# Patient Record
Sex: MALE | NOT HISPANIC OR LATINO | URBAN - METROPOLITAN AREA
[De-identification: names, ages, dates, MRNs, and addresses within clinical notes are randomized per-mention and may not be internally consistent; named-entity substitution may affect disease eponyms.]

---

## 2017-12-27 ENCOUNTER — MEDICAL CORRESPONDENCE (OUTPATIENT)
Dept: HEALTH INFORMATION MANAGEMENT | Facility: CLINIC | Age: 82
End: 2017-12-27

## 2018-01-23 DIAGNOSIS — H53.10 SUBJECTIVE VISION DISTURBANCE: Primary | ICD-10-CM

## 2018-01-30 ENCOUNTER — OFFICE VISIT (OUTPATIENT)
Dept: OPHTHALMOLOGY | Facility: CLINIC | Age: 83
End: 2018-01-30
Attending: OPHTHALMOLOGY
Payer: MEDICARE

## 2018-01-30 DIAGNOSIS — H49.22 SIXTH NERVE PALSY, LEFT: Primary | ICD-10-CM

## 2018-01-30 DIAGNOSIS — H35.30 AMD (AGE RELATED MACULAR DEGENERATION): ICD-10-CM

## 2018-01-30 DIAGNOSIS — H26.9 NUCLEAR CATARACT, NONSENILE: ICD-10-CM

## 2018-01-30 PROBLEM — Q61.3 POLYCYSTIC KIDNEY DISEASE: Status: ACTIVE | Noted: 2018-01-30

## 2018-01-30 PROBLEM — I48.91 ATRIAL FIBRILLATION (H): Status: ACTIVE | Noted: 2017-07-30

## 2018-01-30 PROBLEM — H53.2 DOUBLE VISION WITH BOTH EYES OPEN: Status: ACTIVE | Noted: 2017-12-24

## 2018-01-30 PROBLEM — I10 HIGH BLOOD PRESSURE: Status: ACTIVE | Noted: 2018-01-30

## 2018-01-30 PROCEDURE — G0463 HOSPITAL OUTPT CLINIC VISIT: HCPCS | Mod: 25,ZF

## 2018-01-30 PROCEDURE — 92060 SENSORIMOTOR EXAMINATION: CPT | Mod: ZF | Performed by: OPHTHALMOLOGY

## 2018-01-30 RX ORDER — CLOPIDOGREL BISULFATE 75 MG/1
TABLET ORAL
COMMUNITY
Start: 2017-07-30

## 2018-01-30 RX ORDER — NITROGLYCERIN 0.4 MG/1
TABLET SUBLINGUAL
COMMUNITY
Start: 2017-07-30

## 2018-01-30 RX ORDER — METOPROLOL TARTRATE 50 MG
TABLET ORAL
COMMUNITY
Start: 2017-07-30

## 2018-01-30 RX ORDER — BUMETANIDE 1 MG/1
TABLET ORAL
COMMUNITY
Start: 2017-10-01

## 2018-01-30 RX ORDER — LOSARTAN POTASSIUM 50 MG/1
TABLET ORAL
COMMUNITY

## 2018-01-30 RX ORDER — CHOLECALCIFEROL (VITAMIN D3) 50 MCG
TABLET ORAL
COMMUNITY

## 2018-01-30 RX ORDER — LANOLIN ALCOHOL/MO/W.PET/CERES
CREAM (GRAM) TOPICAL
COMMUNITY

## 2018-01-30 ASSESSMENT — CUP TO DISC RATIO
OS_RATIO: 0.3
OD_RATIO: 0.3

## 2018-01-30 ASSESSMENT — VISUAL ACUITY
METHOD: SNELLEN - LINEAR
OD_SC: 20/50
OS_SC: 20/30
OD_PH_SC: 20/40

## 2018-01-30 ASSESSMENT — CONF VISUAL FIELD
OS_NORMAL: 1
OD_NORMAL: 1

## 2018-01-30 ASSESSMENT — SLIT LAMP EXAM - LIDS
COMMENTS: NORMAL
COMMENTS: NORMAL

## 2018-01-30 ASSESSMENT — TONOMETRY
OS_IOP_MMHG: 13
OD_IOP_MMHG: 14
IOP_METHOD: ICARE

## 2018-01-30 ASSESSMENT — EXTERNAL EXAM - RIGHT EYE: OD_EXAM: NORMAL

## 2018-01-30 ASSESSMENT — EXTERNAL EXAM - LEFT EYE: OS_EXAM: NORMAL

## 2018-01-30 NOTE — NURSING NOTE
Chief Complaints and History of Present Illnesses   Patient presents with     Neurologic Problem     new patient     HPI    Symptoms:              Comments:  Vitaliy is an 84 year old male referred for:    1. Diplopia  Intermittent. Used to be constant. Feels it is clearing up.   Worse in the left eye  Onset dec 24/2017. No precipitating event.   No history of diabetes or hyperlipidemia. His hypertension is controlled with medications.   Worse when tired. Unsure if worse at near or distance.     Ernst MARSHALL 1:39 PM January 30, 2018

## 2018-01-30 NOTE — LETTER
2018         RE:  :  MRN: Vitaliy Espinoza  1/15/1934  [id number]     Dear Dr. Devries:     Thank you for asking me to see your very pleasant patient, Vitaliy Espinoza, in neuro-ophthalmic consultation.  I would like to thank you for sending your records and I have summarized them in the history of present illness. He presented with his spouse and daughter who provided additional history.  My assessment and plan are below.  For further details, please see my attached clinic note.          Assessment & Plan     Vitaliy Espinoza is a 84 year old male with the following diagnoses:   1. Sixth nerve palsy, left    2. Nuclear cataract, nonsenile    3. AMD (age related macular degeneration)       He developed double vision on [place] while driving. It was binocular.  Went to the ER and an MRI was unremarkable. He had a dull headache lasting 2-3 weeks.  He started Bumex on  and was told that he may have a headache.  He noticed some left jaw pain around [place].  This was unrelated to chewing.  Otherwise no symptoms of Giant cell arteritis.  Overall, the double vision has been improving.       He has nuclear sclerotic cataracts.  His visual acuity is subnormal.  I would recommend that he consider cataract surgery at some point in the future.     He has age-related macular degeneration in both eyes.  He notes some mild waviness of the Amsler grid in the right eye.  The left eye is normal.  I did not observe any elevation of the macula in the right eye.  I recommended he visit with a retina specialist to establish care.  He will also take Ocuvite.  I asked him to look at an Amsler grid daily.       I recommended that he follow-up in 6-8 weeks for repeat evaluation.    Again, thank you for allowing me to participate in the care of your patient.      Sincerely,    Preston Leiva MD  Professor, Neuro-Ophthalmology  Department of Ophthalmology and Visual Neurosciences  [facility]    DX = 6th  nerve palsy

## 2018-01-30 NOTE — PROGRESS NOTES
Assessment & Plan     Vitaliy Espinoza is a 84 year old male with the following diagnoses:   1. Sixth nerve palsy, left    2. Nuclear cataract, nonsenile    3. AMD (age related macular degeneration)       He developed double vision on [place] while driving. It was binocular.  Went to the ER and an MRI was unremarkable. He had a dull headache lasting 2-3 weeks.  He started Bumex on December 14 and was told that he may have a headache.  He noticed some left jaw pain around [place].  This was unrelated to chewing.  Otherwise no symptoms of Giant cell arteritis.  Overall, the double vision has been improving.       He has nuclear sclerotic cataracts.  His visual acuity is subnormal.  I would recommend that he consider cataract surgery at some point in the future.     He has age-related macular degeneration in both eyes.  He notes some mild waviness of the Amsler grid in the right eye.  The left eye is normal.  I did not observe any elevation of the macula in the right eye.  I recommended he visit with a retina specialist to establish care.  He will also take Ocuvite.  I asked him to look at an Amsler grid daily.       I recommended that he follow-up in 6-8 weeks for repeat evaluation.         Attending Physician Attestation:  Complete documentation of historical and exam elements from today's encounter can be found in the full encounter summary report (not reduplicated in this progress note).  I personally obtained the chief complaint(s) and history of present illness.  I confirmed and edited as necessary the review of systems, past medical/surgical history, family history, social history, and examination findings as documented by others; and I examined the patient myself.  I personally reviewed the relevant tests, images, and reports as documented above.  I formulated and edited as necessary the assessment and plan and discussed the findings and management plan with the patient and family. - Prseton EGAN  Cali ALVARES

## 2020-03-11 ENCOUNTER — HEALTH MAINTENANCE LETTER (OUTPATIENT)
Age: 85
End: 2020-03-11

## 2021-01-03 ENCOUNTER — HEALTH MAINTENANCE LETTER (OUTPATIENT)
Age: 86
End: 2021-01-03

## 2021-04-25 ENCOUNTER — HEALTH MAINTENANCE LETTER (OUTPATIENT)
Age: 86
End: 2021-04-25

## 2021-10-10 ENCOUNTER — HEALTH MAINTENANCE LETTER (OUTPATIENT)
Age: 86
End: 2021-10-10

## 2022-05-21 ENCOUNTER — HEALTH MAINTENANCE LETTER (OUTPATIENT)
Age: 87
End: 2022-05-21

## 2022-09-17 ENCOUNTER — HEALTH MAINTENANCE LETTER (OUTPATIENT)
Age: 87
End: 2022-09-17

## 2023-06-04 ENCOUNTER — HEALTH MAINTENANCE LETTER (OUTPATIENT)
Age: 88
End: 2023-06-04

## 2023-10-16 NOTE — MR AVS SNAPSHOT
After Visit Summary   1/30/2018    Vitaliy Espinoza    MRN: [id number]           Patient Information     Date Of Birth          1/15/1934        Visit Information        Provider Department      1/30/2018 1:30 PM Preston Leiva MD [facility]        Today's Diagnoses     Sixth nerve palsy, left    -  1       Follow-ups after your visit        Follow-up notes from your care team     Return in about 6 weeks (around 3/13/2018) for Vision, tension color.      Your next 10 appointments already scheduled     Mar 15, 2018  1:00 PM CDT   RETURN NEURO with Preston Leiva MD   Eye Clinic ([facility])    [address]  [address]  [address]  [place] [id number] [phone]              Who to contact     Please call your clinic at [phone] to:    Ask questions about your health    Make or cancel appointments    Discuss your medicines    Learn about your test results    Speak to your doctor   If you have compliments or concerns about an experience at your clinic, or if you wish to file a complaint, please contact [facility] Patient Relations at [phone] or email us at [email or web address]         Additional Information About Your Visit        MyChart Information     UIBLUEPRINTt gives you secure access to your electronic health record. If you see a primary care provider, you can also send messages to your care team and make appointments. If you have questions, please call your primary care clinic.  If you do not have a primary care provider, please call [phone] and they will assist you.      YouMail is an electronic gateway that provides easy, online access to your medical records. With YouMail, you can request a clinic appointment, read your test results, renew a prescription or communicate with your care team.     To access your existing account, please contact your [facility] or call  Ok to hold xarelto as requested.   [phone] for assistance.        Care EveryWhere ID     This is your Care EveryWhere ID. This could be used by other organizations to access your [place] medical records  [id number]         Blood Pressure from Last 3 Encounters:   No data found for BP    Weight from Last 3 Encounters:   No data found for Wt              We Performed the Following     Sensorimotor        Primary Care Provider Office Phone # Fax #    Misael BONILLA Jaycob [phone]       [address] [address]        Equal Access to Services     CYRIL DANG : Hadii aad ku hadasho Soomaali, waaxda luqadaha, qaybta kaalmada adeegyada, waxay idiin hayaan adeeg jamshid costa . So [facility] [phone].    ATENCIÓN: Si habla español, tiene a cobb disposición servicios gratuitos de asistencia lingüística. [facility] [phone].    We comply with applicable federal civil rights laws and [place] laws. We do not discriminate on the basis of race, color, national origin, age, disability, sex, sexual orientation, or gender identity.            Thank you!     Thank you for choosing EYE CLINIC  for your care. Our goal is always to provide you with excellent care. Hearing back from our patients is one way we can continue to improve our services. Please take a few minutes to complete the written survey that you may receive in the mail after your visit with us. Thank you!             Your Updated Medication List - Protect others around you: Learn how to safely use, store and throw away your medicines at www.disposemymeds.org.          This list is accurate as of 1/30/18  2:17 PM.  Always use your most recent med list.                   Brand Name Dispense Instructions for use Diagnosis    ASPIRIN 81 PO           bumetanide 1 MG tablet    BUMEX          clopidogrel 75 MG tablet    PLAVIX          folic acid 400 MCG tablet    FOLVITE          ISOSORBIDE MONONITRATE ER PO           losartan 50 MG tablet    COZAAR          metoprolol  tartrate 50 MG tablet    LOPRESSOR          nitroGLYcerin 0.4 MG sublingual tablet    NITROSTAT          Vitamin D3 2000 UNITS Tabs